# Patient Record
(demographics unavailable — no encounter records)

---

## 2017-10-30 NOTE — EMERGENCY ROOM REPORT
History of Present Illness


General


Chief Complaint:  Flu Like Symptoms


Source:  Patient





Present Illness


HPI


 41 YO Female presents to the ED c/O cough, productive sputum, chills and body 

aches x 2 weeks. pt. reports son who is 15 has similar symptoms at home. pt. 

reports hx of asthma when she gets sick. pt .reports subjective fevers. pt. 

reports posterior rib cage pain 8/10 in severity exacerbated when coughing. pt. 

denies PMHx. denies HA, neck pain or stiffness. Denies CP, Palpitations, LOC, 

AMS, dizziness, Changes in Vision, Sensation, paresthesias, or a sudden severe 

headache.


Allergies:  


Coded Allergies:  


     No Known Allergies (Unverified , 9/23/12)





Patient History


Past Medical History:  see triage record


Past Surgical History:  none


Pertinent Family History:  none


Pregnant Now:  No


Reviewed Nursing Documentation:  PMH: Agreed, PSxH: Agreed





Nursing Documentation-PMH


Past Medical History:  No Stated History





Review of Systems


All Other Systems:  negative except mentioned in HPI





Physical Exam





Vital Signs








  Date Time  Temp Pulse Resp B/P (MAP) Pulse Ox O2 Delivery O2 Flow Rate FiO2


 


10/30/17 14:44 99.0 90 20 130/88 99 Room Air  








Sp02 EP Interpretation:  reviewed, normal


General Appearance:  no apparent distress, alert, GCS 15, non-toxic


Head:  normocephalic, atraumatic


Eyes:  bilateral eye normal inspection, bilateral eye PERRL


ENT:  hearing grossly normal, normal voice, TMs + canals normal, uvula midline


Neck:  full range of motion, no meningismus, supple/symm/no masses


Respiratory:  chest non-tender, lungs clear, normal breath sounds, speaking 

full sentences, wheezing - mild bilateral wheezes


Cardiovascular #1:  regular rate, rhythm, normal capillary refill


Musculoskeletal:  back normal, gait/station normal, normal range of motion, non-

tender


Neurologic:  alert, oriented x3, responsive, motor strength/tone normal, 

sensory intact, speech normal


Skin:  normal color, no rash, warm/dry, well hydrated


Lymphatic:  no adenopathy





Medical Decision Making


PA Attestation


Dr. Godoy  is my supervising Physician whom patient management has been 

discussed with.


Diagnostic Impression:  


 Primary Impression:  


 Atypical pneumonia


ER Course


41 YO Female presents to the ED c/O cough, productive sputum, chills and body 

aches x 2 weeks. pt. reports son who is 15 has similar symptoms at home. pt. 

reports hx of asthma when she gets sick. pt .reports subjective fevers. pt. 

reports posterior rib cage pain 8/10 in severity exacerbated when coughing. pt. 

denies PMHx. denies HA, neck pain or stiffness. Denies CP, Palpitations, LOC, 

AMS, dizziness, Changes in Vision, Sensation, paresthesias, or a sudden severe 

headache.





Ddx considered but are not limited to URI, pneumonia, PE, strep pharyngitis, 

meningitis.





Vital signs: Pt. is afebrile, the remaining VS are WNL


H&PE are most consistent with URI-  atypical pneumonia due to presence of 

symptoms x 2 weeks, hx of asthma, and ill adolescent contact 





ORDERS: none required at this time, the diagnosis is clinical





ED INTERVENTIONS: None required at this time. 








DISCHARGE: At this time pt. is stable for d/c to home. Will provide printed 

patient care instructions, and any necessary prescriptions. Care plan and 

follow up instructions have been discussed with the patient prior to discharge.





Last Vital Signs








  Date Time  Temp Pulse Resp B/P (MAP) Pulse Ox O2 Delivery O2 Flow Rate FiO2


 


10/30/17 14:54 99.0  16 130/88 99 Room Air  


 


10/30/17 14:54  80      








Disposition:  HOME, SELF-CARE


Condition:  Stable


Scripts


Guaifenesin (Guaifenesin) 1,200 Mg Tab.er.12h


1200 MG PO Q12HR for 10 Days, #20 TAB


   Prov: Svitlana Alvarez         10/30/17 


Albuterol Sulfate* (ALBUTEROL SULFATE MDI*) 8.5 Gm Hfa.aer.ad


2 PUFF INH Q3H, #1 INH 0 Refills


   Prov: Svitlana Alvarez         10/30/17 


Azithromycin* (ZITHROMAX*) 250 Mg Tablet


250 MG ORAL DAILY, #6 TAB 0 Refills


   Take two tables once daily for 1 day, then one tablet once daily for 4


   days.


   Prov: Svitlana Alvarez         10/30/17 


Codeine/Promethazine Hcl* (PROMETHAZINE-CODEINE SYRUP*) 118 Ml Syrup


5 ML ORAL Q6H Y for For Cough, #120 ML 0 Refills


   Prov: Svitlana Alvarez         10/30/17


Patient Instructions:  Upper Respiratory Infection, Adult





Additional Instructions:  


Take medications as directed. 


 ** Follow up with a Primary Care Provider in 3-5 days, even if your symptoms 

have resolved. ** 


--Please review list of primary care clinics, if you do not already have a 

primary care provider





Return sooner to ED if new symptoms occur, or current symptoms become worse. 


Do not drink alcohol, drive, or operate heavy machinery while taking cough 

syrup as this may cause drowsiness. 











- Please note that this Emergency Department Report was dictated using [employer] technology software, occasionally this can lead to 

erroneous entry secondary to interpretation by the dictation equipment.











Svitlana Alvarez Oct 30, 2017 15:42

## 2017-10-30 NOTE — EMERGENCY ROOM REPORT
History of Present Illness


General


Chief Complaint:  Flu Like Symptoms


Source:  Patient





Present Illness


HPI


 41 YO Female presents to the ED c/O cough, productive sputum, chills and body 

aches x 2 weeks. pt. reports son who is 15 has similar symptoms at home. pt. 

reports hx of asthma when she gets sick. pt .reports subjective fevers. pt. 

reports posterior rib cage pain 8/10 in severity exacerbated when coughing. pt. 

denies PMHx. denies HA, neck pain or stiffness. Denies CP, Palpitations, LOC, 

AMS, dizziness, Changes in Vision, Sensation, paresthesias, or a sudden severe 

headache.


Allergies:  


Coded Allergies:  


     No Known Allergies (Unverified , 9/23/12)





Patient History


Past Medical History:  see triage record


Past Surgical History:  none


Pertinent Family History:  none


Pregnant Now:  No


Reviewed Nursing Documentation:  PMH: Agreed, PSxH: Agreed





Nursing Documentation-PMH


Past Medical History:  No Stated History





Review of Systems


All Other Systems:  negative except mentioned in HPI





Physical Exam





Vital Signs








  Date Time  Temp Pulse Resp B/P (MAP) Pulse Ox O2 Delivery O2 Flow Rate FiO2


 


10/30/17 14:44 99.0 90 20 130/88 99 Room Air  








Sp02 EP Interpretation:  reviewed, normal


General Appearance:  no apparent distress, alert, GCS 15, non-toxic


Head:  normocephalic, atraumatic


Eyes:  bilateral eye normal inspection, bilateral eye PERRL


ENT:  hearing grossly normal, normal voice, TMs + canals normal, uvula midline


Neck:  full range of motion, no meningismus, supple/symm/no masses


Respiratory:  chest non-tender, lungs clear, normal breath sounds, speaking 

full sentences, wheezing - mild bilateral wheezes


Cardiovascular #1:  regular rate, rhythm, normal capillary refill


Musculoskeletal:  back normal, gait/station normal, normal range of motion, non-

tender


Neurologic:  alert, oriented x3, responsive, motor strength/tone normal, 

sensory intact, speech normal


Skin:  normal color, no rash, warm/dry, well hydrated


Lymphatic:  no adenopathy





Medical Decision Making


PA Attestation


Dr. Godoy  is my supervising Physician whom patient management has been 

discussed with.


Diagnostic Impression:  


 Primary Impression:  


 Atypical pneumonia


ER Course


41 YO Female presents to the ED c/O cough, productive sputum, chills and body 

aches x 2 weeks. pt. reports son who is 15 has similar symptoms at home. pt. 

reports hx of asthma when she gets sick. pt .reports subjective fevers. pt. 

reports posterior rib cage pain 8/10 in severity exacerbated when coughing. pt. 

denies PMHx. denies HA, neck pain or stiffness. Denies CP, Palpitations, LOC, 

AMS, dizziness, Changes in Vision, Sensation, paresthesias, or a sudden severe 

headache.





Ddx considered but are not limited to URI, pneumonia, PE, strep pharyngitis, 

meningitis.





Vital signs: Pt. is afebrile, the remaining VS are WNL


H&PE are most consistent with URI-  atypical pneumonia due to presence of 

symptoms x 2 weeks, hx of asthma, and ill adolescent contact 





ORDERS: none required at this time, the diagnosis is clinical





ED INTERVENTIONS: None required at this time. 








DISCHARGE: At this time pt. is stable for d/c to home. Will provide printed 

patient care instructions, and any necessary prescriptions. Care plan and 

follow up instructions have been discussed with the patient prior to discharge.





Last Vital Signs








  Date Time  Temp Pulse Resp B/P (MAP) Pulse Ox O2 Delivery O2 Flow Rate FiO2


 


10/30/17 14:54 99.0  16 130/88 99 Room Air  


 


10/30/17 14:54  80      








Disposition:  HOME, SELF-CARE


Condition:  Stable


Scripts


Guaifenesin (Guaifenesin) 1,200 Mg Tab.er.12h


1200 MG PO Q12HR for 10 Days, #20 TAB


   Prov: Svitlana Alvarez         10/30/17 


Albuterol Sulfate* (ALBUTEROL SULFATE MDI*) 8.5 Gm Hfa.aer.ad


2 PUFF INH Q3H, #1 INH 0 Refills


   Prov: Svitlana Alvarez         10/30/17 


Azithromycin* (ZITHROMAX*) 250 Mg Tablet


250 MG ORAL DAILY, #6 TAB 0 Refills


   Take two tables once daily for 1 day, then one tablet once daily for 4


   days.


   Prov: Svitlana Alvarez         10/30/17 


Codeine/Promethazine Hcl* (PROMETHAZINE-CODEINE SYRUP*) 118 Ml Syrup


5 ML ORAL Q6H Y for For Cough, #120 ML 0 Refills


   Prov: Svitlana Alvarez         10/30/17


Patient Instructions:  Upper Respiratory Infection, Adult





Additional Instructions:  


Take medications as directed. 


 ** Follow up with a Primary Care Provider in 3-5 days, even if your symptoms 

have resolved. ** 


--Please review list of primary care clinics, if you do not already have a 

primary care provider





Return sooner to ED if new symptoms occur, or current symptoms become worse. 


Do not drink alcohol, drive, or operate heavy machinery while taking cough 

syrup as this may cause drowsiness. 











- Please note that this Emergency Department Report was dictated using [employer] technology software, occasionally this can lead to 

erroneous entry secondary to interpretation by the dictation equipment.











Svitlana Alvarez Oct 30, 2017 15:42

## 2017-10-30 NOTE — EMERGENCY ROOM REPORT
History of Present Illness


General


Chief Complaint:  Flu Like Symptoms


Source:  Patient





Present Illness


HPI


 43 YO Female presents to the ED c/O cough, productive sputum, chills and body 

aches x 2 weeks. pt. reports son who is 15 has similar symptoms at home. pt. 

reports hx of asthma when she gets sick. pt .reports subjective fevers. pt. 

reports posterior rib cage pain 8/10 in severity exacerbated when coughing. pt. 

denies PMHx. denies HA, neck pain or stiffness. Denies CP, Palpitations, LOC, 

AMS, dizziness, Changes in Vision, Sensation, paresthesias, or a sudden severe 

headache.


Allergies:  


Coded Allergies:  


     No Known Allergies (Unverified , 9/23/12)





Patient History


Past Medical History:  see triage record


Past Surgical History:  none


Pertinent Family History:  none


Pregnant Now:  No


Reviewed Nursing Documentation:  PMH: Agreed, PSxH: Agreed





Nursing Documentation-PMH


Past Medical History:  No Stated History





Review of Systems


All Other Systems:  negative except mentioned in HPI





Physical Exam





Vital Signs








  Date Time  Temp Pulse Resp B/P (MAP) Pulse Ox O2 Delivery O2 Flow Rate FiO2


 


10/30/17 14:44 99.0 90 20 130/88 99 Room Air  








Sp02 EP Interpretation:  reviewed, normal


General Appearance:  no apparent distress, alert, GCS 15, non-toxic


Head:  normocephalic, atraumatic


Eyes:  bilateral eye normal inspection, bilateral eye PERRL


ENT:  hearing grossly normal, normal voice, TMs + canals normal, uvula midline


Neck:  full range of motion, no meningismus, supple/symm/no masses


Respiratory:  chest non-tender, lungs clear, normal breath sounds, speaking 

full sentences, wheezing - mild bilateral wheezes


Cardiovascular #1:  regular rate, rhythm, normal capillary refill


Musculoskeletal:  back normal, gait/station normal, normal range of motion, non-

tender


Neurologic:  alert, oriented x3, responsive, motor strength/tone normal, 

sensory intact, speech normal


Skin:  normal color, no rash, warm/dry, well hydrated


Lymphatic:  no adenopathy





Medical Decision Making


PA Attestation


Dr. Godoy  is my supervising Physician whom patient management has been 

discussed with.


Diagnostic Impression:  


 Primary Impression:  


 Atypical pneumonia


ER Course


43 YO Female presents to the ED c/O cough, productive sputum, chills and body 

aches x 2 weeks. pt. reports son who is 15 has similar symptoms at home. pt. 

reports hx of asthma when she gets sick. pt .reports subjective fevers. pt. 

reports posterior rib cage pain 8/10 in severity exacerbated when coughing. pt. 

denies PMHx. denies HA, neck pain or stiffness. Denies CP, Palpitations, LOC, 

AMS, dizziness, Changes in Vision, Sensation, paresthesias, or a sudden severe 

headache.





Ddx considered but are not limited to URI, pneumonia, PE, strep pharyngitis, 

meningitis.





Vital signs: Pt. is afebrile, the remaining VS are WNL


H&PE are most consistent with URI-  atypical pneumonia due to presence of 

symptoms x 2 weeks, hx of asthma, and ill adolescent contact 





ORDERS: none required at this time, the diagnosis is clinical





ED INTERVENTIONS: None required at this time. 








DISCHARGE: At this time pt. is stable for d/c to home. Will provide printed 

patient care instructions, and any necessary prescriptions. Care plan and 

follow up instructions have been discussed with the patient prior to discharge.





Last Vital Signs








  Date Time  Temp Pulse Resp B/P (MAP) Pulse Ox O2 Delivery O2 Flow Rate FiO2


 


10/30/17 14:54 99.0  16 130/88 99 Room Air  


 


10/30/17 14:54  80      








Disposition:  HOME, SELF-CARE


Condition:  Stable


Scripts


Guaifenesin (Guaifenesin) 1,200 Mg Tab.er.12h


1200 MG PO Q12HR for 10 Days, #20 TAB


   Prov: Svitlana Alvarez         10/30/17 


Albuterol Sulfate* (ALBUTEROL SULFATE MDI*) 8.5 Gm Hfa.aer.ad


2 PUFF INH Q3H, #1 INH 0 Refills


   Prov: Svitlana Alvarez         10/30/17 


Azithromycin* (ZITHROMAX*) 250 Mg Tablet


250 MG ORAL DAILY, #6 TAB 0 Refills


   Take two tables once daily for 1 day, then one tablet once daily for 4


   days.


   Prov: Svitlana Alvarez         10/30/17 


Codeine/Promethazine Hcl* (PROMETHAZINE-CODEINE SYRUP*) 118 Ml Syrup


5 ML ORAL Q6H Y for For Cough, #120 ML 0 Refills


   Prov: Svitlana Alvarez         10/30/17


Patient Instructions:  Upper Respiratory Infection, Adult





Additional Instructions:  


Take medications as directed. 


 ** Follow up with a Primary Care Provider in 3-5 days, even if your symptoms 

have resolved. ** 


--Please review list of primary care clinics, if you do not already have a 

primary care provider





Return sooner to ED if new symptoms occur, or current symptoms become worse. 


Do not drink alcohol, drive, or operate heavy machinery while taking cough 

syrup as this may cause drowsiness. 











- Please note that this Emergency Department Report was dictated using [employer] technology software, occasionally this can lead to 

erroneous entry secondary to interpretation by the dictation equipment.











Svitlana Alvarez Oct 30, 2017 15:42